# Patient Record
Sex: FEMALE | Race: WHITE | NOT HISPANIC OR LATINO
[De-identification: names, ages, dates, MRNs, and addresses within clinical notes are randomized per-mention and may not be internally consistent; named-entity substitution may affect disease eponyms.]

---

## 2018-02-22 ENCOUNTER — APPOINTMENT (OUTPATIENT)
Dept: CARDIOLOGY | Facility: CLINIC | Age: 43
End: 2018-02-22
Payer: COMMERCIAL

## 2018-02-22 ENCOUNTER — NON-APPOINTMENT (OUTPATIENT)
Age: 43
End: 2018-02-22

## 2018-02-22 VITALS
WEIGHT: 148 LBS | BODY MASS INDEX: 27.23 KG/M2 | SYSTOLIC BLOOD PRESSURE: 99 MMHG | HEART RATE: 73 BPM | DIASTOLIC BLOOD PRESSURE: 60 MMHG | HEIGHT: 62 IN | OXYGEN SATURATION: 100 %

## 2018-02-22 DIAGNOSIS — R01.1 CARDIAC MURMUR, UNSPECIFIED: ICD-10-CM

## 2018-02-22 DIAGNOSIS — R07.89 OTHER CHEST PAIN: ICD-10-CM

## 2018-02-22 PROCEDURE — 99203 OFFICE O/P NEW LOW 30 MIN: CPT | Mod: 25

## 2018-02-22 PROCEDURE — 93000 ELECTROCARDIOGRAM COMPLETE: CPT

## 2018-03-05 ENCOUNTER — APPOINTMENT (OUTPATIENT)
Dept: CARDIOLOGY | Facility: CLINIC | Age: 43
End: 2018-03-05
Payer: COMMERCIAL

## 2018-03-05 PROCEDURE — 93306 TTE W/DOPPLER COMPLETE: CPT

## 2021-03-18 ENCOUNTER — APPOINTMENT (OUTPATIENT)
Dept: PEDIATRIC MEDICAL GENETICS | Facility: CLINIC | Age: 46
End: 2021-03-18
Payer: COMMERCIAL

## 2021-03-18 ENCOUNTER — LABORATORY RESULT (OUTPATIENT)
Age: 46
End: 2021-03-18

## 2021-03-18 VITALS
BODY MASS INDEX: 29.54 KG/M2 | SYSTOLIC BLOOD PRESSURE: 105 MMHG | HEIGHT: 62.2 IN | DIASTOLIC BLOOD PRESSURE: 76 MMHG | WEIGHT: 162.56 LBS

## 2021-03-18 DIAGNOSIS — M24.80 OTHER SPECIFIC JOINT DERANGEMENTS OF UNSPECIFIED JOINT, NOT ELSEWHERE CLASSIFIED: ICD-10-CM

## 2021-03-18 DIAGNOSIS — R42 DIZZINESS AND GIDDINESS: ICD-10-CM

## 2021-03-18 DIAGNOSIS — H02.403 UNSPECIFIED PTOSIS OF BILATERAL EYELIDS: ICD-10-CM

## 2021-03-18 DIAGNOSIS — I47.2 VENTRICULAR TACHYCARDIA: ICD-10-CM

## 2021-03-18 DIAGNOSIS — R79.0 ABNORMAL LVL OF BLOOD MINERAL: ICD-10-CM

## 2021-03-18 DIAGNOSIS — H05.20 UNSPECIFIED EXOPHTHALMOS: ICD-10-CM

## 2021-03-18 DIAGNOSIS — I51.7 CARDIOMEGALY: ICD-10-CM

## 2021-03-18 LAB
25(OH)D3 SERPL-MCNC: 25.1 NG/ML
CK SERPL-CCNC: 45 U/L
FOLATE SERPL-MCNC: >20 NG/ML
IRON SATN MFR SERPL: 12 %
IRON SERPL-MCNC: 42 UG/DL
TIBC SERPL-MCNC: 357 UG/DL
UIBC SERPL-MCNC: 315 UG/DL
VIT B12 SERPL-MCNC: 435 PG/ML

## 2021-03-18 PROCEDURE — 99245 OFF/OP CONSLTJ NEW/EST HI 55: CPT

## 2021-03-18 PROCEDURE — 36415 COLL VENOUS BLD VENIPUNCTURE: CPT

## 2021-03-18 PROCEDURE — 99072 ADDL SUPL MATRL&STAF TM PHE: CPT

## 2021-03-18 NOTE — BIRTH HISTORY
[FreeTextEntry1] : Aubrie had no congenital birth defects or joint dislocations. Early childhood development was within normal limits.

## 2021-03-18 NOTE — FAMILY HISTORY
[FreeTextEntry1] : Aubrie is of [place] descent, consanguinity is denied. Family history was not significant for any other individuals presenting with arrhythmia or a heritable connective tissue disorder. \par \par Maternal family history includes:\par -maternal grandmother with an aortic aneurysm\par -maternal grandfather had lung cancer\par -maternal aunt who had breast cancer and lung cancer\par -maternal aunt who had brain cancer \par \par Paternal family history includes:\par -father had prostate cancer and  of neuroendocrine carcinoma\par \par The patient's family history was otherwise unremarkable.

## 2021-03-18 NOTE — HISTORY OF PRESENT ILLNESS
[FreeTextEntry1] : Aubrie saw Dr. Ledesma in 2019 with complaints of paresthesias in legs and arms, dizziness, lightheadedness, and fatigue. She also had a 20 year history of recurrent episodes of chest pain with shortness of breath. She had a normal brain MRI and normal EMG study but was found to have low ferritin. She was treated with infusions of Venofer, which helped to resolve her dizziness and fatigue. Dr. Ledesma felt she had features of EDS and referred her to Dr. Alecia Cunningham for evaluation. She was also referred to cardiology An EKG showed ventricular tachycardia and an EP study was recommended. Aubrie sought a second opinion at [facility], Dr. Pj Hawkins. A cardiac MRI was done which showed moderately dilated left ventricle, as well as findings suggestive of prior myocarditis. Subsequently, an EP study was also recommended by Dr. Hawkins. Aubrie has been noted to be hypermobile, but is interested in ruling out a possible other form of EDS prior to pursuing the EP study.

## 2021-03-18 NOTE — PHYSICAL EXAM
[Total Score ___] : Total Score = [unfilled] [Normal] : no mass, no hepatosplenomegaly [DTR] : deep tendon reflexes are normal [de-identified] : normal extensibility of skin, skin is soft, well-healed narrow  scar, +postpubertal abdominal/flank striae, no atrophic/spontaneous/hemosideritic scars, mild varicose veins on legs, no prematurely aged appearance or paucity of SQ fat [de-identified] : HC: 57.25 cm, facial features are normal, normal chin, normal malar region, normal hairline [de-identified] : eyes are prominent, mild ptosis bilaterally, no cc/si, normal sclera [de-identified] : no p/t/c [de-identified] : normal philtrum, normal lips, normal palate, normal uvula, good dentition [de-identified] : no deformity of chest wall [de-identified] : normal nails and creases, no pes planus, piezogenic papules not present bilaterally, negative thumb sign, +wrist sign b/l [de-identified] : no scoliosis [de-identified] : no focal deficits [de-identified] : Arm span is 167.5 cm. Arm rjkl-oi-[id number] ratio 1.06 [Right] : Right: N [Left] : Left: N [] : No

## 2021-03-21 LAB — COPPER SERPL-MCNC: 122 UG/DL

## 2021-03-22 LAB
ACRM BINDING ANTIBODY: 0.06 NMOL/L
TRYPTASE: 5.2 UG/L

## 2021-03-24 LAB
CARNITINE FREE SERPL-SCNC: 45 UMOL/L
CARNITINE SERPL-SCNC: 58 UMOL/L
CELIAC DISEASE INTERPRETATION: NORMAL
CELIAC GENE PAIRS PRESENT: NO
DQ ALPHA 1: NORMAL
DQ BETA 1: NORMAL
ESTERIFIED/FREE: 0.3 RATIO
IMMUNOGLOBULIN A (IGA): 105 MG/DL
VIT C SERPL-MCNC: 1.4 MG/DL

## 2021-05-06 ENCOUNTER — APPOINTMENT (OUTPATIENT)
Dept: PEDIATRIC MEDICAL GENETICS | Facility: CLINIC | Age: 46
End: 2021-05-06
Payer: COMMERCIAL

## 2021-05-06 PROCEDURE — ZZZZZ: CPT

## 2021-05-07 NOTE — HISTORY OF PRESENT ILLNESS
[FreeTextEntry1] : Aubrie is 46 year-old female who was seen for an initial Genetics evaluation on March 18,2021. She presented with subjective joint hypermobility, intermittent arthralgias, paresthesias, and headaches, fatigue, low ferritin s/p infusions, non-sustained ventricular tachycardia (NSVT) with left ventricular dilatation and possible prior myocarditis, orthostatic intolerance, and easy bruising, with family history of maternal grandmother with aortic aneurysm but no known genetic syndromes, who on physical examination has generalized joint hypermobility with a Beighton score of 6/9, with soft but not abnormally extensible skin, striae but postpubertal onset, mild proptosis and ptosis of eyelids, varicose veins, positive wrist sign, and increased arm span:height ratio, without other significant HDCT-associated skin, skeletal, or dysmorphic features. 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 HDCT gene panel testing through [facility] was offered and elected. Non-genetic testing to rule out other etiologies was also ordered. Non-genetic testing was all wnl, with the exception of a low iron saturation of 12% (reference range 14-50) and a low vitamin D 25 hydroxy of 25.1 ng/mL (reference range 30-80).  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 APT7A variants are inherited in an X-linked manner and pathogenic variants commonly cause either Menkes disease or occipital horn syndrome and can also cause a distal motor neuropathy. FBN2 pathogenic variants are inherited in an autosomal dominant manner and are associated with congenital contractural arachnodactyly. \par

## 2021-05-07 NOTE — REASON FOR VISIT
[Follow-Up] : [unfilled]  is being seen for  ~M a follow-up visit [Home] : at home, [unfilled] , at the time of the visit. [Medical Office: ([facility])___] : at the medical office located in  [Other:____] : [unfilled] [Verbal consent obtained from patient] : the patient, [unfilled]